# Patient Record
Sex: MALE | Race: WHITE | NOT HISPANIC OR LATINO | URBAN - METROPOLITAN AREA
[De-identification: names, ages, dates, MRNs, and addresses within clinical notes are randomized per-mention and may not be internally consistent; named-entity substitution may affect disease eponyms.]

---

## 2017-02-10 ENCOUNTER — OUTPATIENT (OUTPATIENT)
Dept: OUTPATIENT SERVICES | Facility: HOSPITAL | Age: 82
LOS: 1 days | End: 2017-02-10
Payer: MEDICARE

## 2017-02-10 ENCOUNTER — APPOINTMENT (OUTPATIENT)
Dept: RADIOLOGY | Facility: IMAGING CENTER | Age: 82
End: 2017-02-10

## 2017-02-10 DIAGNOSIS — Z95.1 PRESENCE OF AORTOCORONARY BYPASS GRAFT: Chronic | ICD-10-CM

## 2017-02-10 DIAGNOSIS — C44.91 BASAL CELL CARCINOMA OF SKIN, UNSPECIFIED: Chronic | ICD-10-CM

## 2017-02-10 DIAGNOSIS — C44.92 SQUAMOUS CELL CARCINOMA OF SKIN, UNSPECIFIED: Chronic | ICD-10-CM

## 2017-02-10 DIAGNOSIS — Z98.890 OTHER SPECIFIED POSTPROCEDURAL STATES: Chronic | ICD-10-CM

## 2017-02-10 DIAGNOSIS — Z85.46 PERSONAL HISTORY OF MALIGNANT NEOPLASM OF PROSTATE: Chronic | ICD-10-CM

## 2017-02-10 DIAGNOSIS — Z98.49 CATARACT EXTRACTION STATUS, UNSPECIFIED EYE: Chronic | ICD-10-CM

## 2017-02-10 DIAGNOSIS — C34.90 MALIGNANT NEOPLASM OF UNSPECIFIED PART OF UNSPECIFIED BRONCHUS OR LUNG: ICD-10-CM

## 2017-02-10 PROCEDURE — 71046 X-RAY EXAM CHEST 2 VIEWS: CPT

## 2017-02-14 ENCOUNTER — APPOINTMENT (OUTPATIENT)
Dept: THORACIC SURGERY | Facility: CLINIC | Age: 82
End: 2017-02-14

## 2017-02-14 VITALS
SYSTOLIC BLOOD PRESSURE: 122 MMHG | WEIGHT: 183 LBS | OXYGEN SATURATION: 98 % | BODY MASS INDEX: 29.41 KG/M2 | HEART RATE: 72 BPM | DIASTOLIC BLOOD PRESSURE: 74 MMHG | HEIGHT: 66 IN | RESPIRATION RATE: 18 BRPM

## 2017-03-02 ENCOUNTER — APPOINTMENT (OUTPATIENT)
Dept: SURGERY | Facility: CLINIC | Age: 82
End: 2017-03-02

## 2017-03-02 LAB
CALCIUM SERPL-MCNC: 9.3 MG/DL
CALCIUM SERPL-MCNC: 9.3 MG/DL
PARATHYROID HORMONE INTACT: 50 PG/ML

## 2017-03-03 LAB — 24R-OH-CALCIDIOL SERPL-MCNC: 39.2 PG/ML

## 2017-03-07 ENCOUNTER — OTHER (OUTPATIENT)
Age: 82
End: 2017-03-07

## 2017-05-08 ENCOUNTER — APPOINTMENT (OUTPATIENT)
Dept: CT IMAGING | Facility: IMAGING CENTER | Age: 82
End: 2017-05-08

## 2017-05-08 ENCOUNTER — OUTPATIENT (OUTPATIENT)
Dept: OUTPATIENT SERVICES | Facility: HOSPITAL | Age: 82
LOS: 1 days | End: 2017-05-08
Payer: MEDICARE

## 2017-05-08 DIAGNOSIS — C44.92 SQUAMOUS CELL CARCINOMA OF SKIN, UNSPECIFIED: Chronic | ICD-10-CM

## 2017-05-08 DIAGNOSIS — Z95.1 PRESENCE OF AORTOCORONARY BYPASS GRAFT: Chronic | ICD-10-CM

## 2017-05-08 DIAGNOSIS — Z85.46 PERSONAL HISTORY OF MALIGNANT NEOPLASM OF PROSTATE: Chronic | ICD-10-CM

## 2017-05-08 DIAGNOSIS — C34.90 MALIGNANT NEOPLASM OF UNSPECIFIED PART OF UNSPECIFIED BRONCHUS OR LUNG: ICD-10-CM

## 2017-05-08 DIAGNOSIS — Z98.890 OTHER SPECIFIED POSTPROCEDURAL STATES: Chronic | ICD-10-CM

## 2017-05-08 DIAGNOSIS — Z98.49 CATARACT EXTRACTION STATUS, UNSPECIFIED EYE: Chronic | ICD-10-CM

## 2017-05-08 DIAGNOSIS — C44.91 BASAL CELL CARCINOMA OF SKIN, UNSPECIFIED: Chronic | ICD-10-CM

## 2017-05-08 PROCEDURE — 71250 CT THORAX DX C-: CPT

## 2017-05-16 ENCOUNTER — APPOINTMENT (OUTPATIENT)
Dept: THORACIC SURGERY | Facility: CLINIC | Age: 82
End: 2017-05-16

## 2017-05-16 VITALS
WEIGHT: 186 LBS | SYSTOLIC BLOOD PRESSURE: 135 MMHG | HEART RATE: 72 BPM | OXYGEN SATURATION: 97 % | BODY MASS INDEX: 29.89 KG/M2 | RESPIRATION RATE: 18 BRPM | DIASTOLIC BLOOD PRESSURE: 80 MMHG | HEIGHT: 66 IN

## 2017-08-08 ENCOUNTER — APPOINTMENT (OUTPATIENT)
Dept: RADIOLOGY | Facility: IMAGING CENTER | Age: 82
End: 2017-08-08
Payer: MEDICARE

## 2017-08-08 ENCOUNTER — OUTPATIENT (OUTPATIENT)
Dept: OUTPATIENT SERVICES | Facility: HOSPITAL | Age: 82
LOS: 1 days | End: 2017-08-08
Payer: MEDICARE

## 2017-08-08 DIAGNOSIS — C34.90 MALIGNANT NEOPLASM OF UNSPECIFIED PART OF UNSPECIFIED BRONCHUS OR LUNG: ICD-10-CM

## 2017-08-08 DIAGNOSIS — Z98.49 CATARACT EXTRACTION STATUS, UNSPECIFIED EYE: Chronic | ICD-10-CM

## 2017-08-08 DIAGNOSIS — C44.92 SQUAMOUS CELL CARCINOMA OF SKIN, UNSPECIFIED: Chronic | ICD-10-CM

## 2017-08-08 DIAGNOSIS — C44.91 BASAL CELL CARCINOMA OF SKIN, UNSPECIFIED: Chronic | ICD-10-CM

## 2017-08-08 DIAGNOSIS — Z95.1 PRESENCE OF AORTOCORONARY BYPASS GRAFT: Chronic | ICD-10-CM

## 2017-08-08 DIAGNOSIS — Z85.46 PERSONAL HISTORY OF MALIGNANT NEOPLASM OF PROSTATE: Chronic | ICD-10-CM

## 2017-08-08 DIAGNOSIS — Z98.890 OTHER SPECIFIED POSTPROCEDURAL STATES: Chronic | ICD-10-CM

## 2017-08-08 PROCEDURE — 71046 X-RAY EXAM CHEST 2 VIEWS: CPT

## 2017-08-08 PROCEDURE — 71020: CPT | Mod: 26

## 2017-08-15 ENCOUNTER — APPOINTMENT (OUTPATIENT)
Dept: THORACIC SURGERY | Facility: CLINIC | Age: 82
End: 2017-08-15
Payer: MEDICARE

## 2017-08-15 VITALS
HEIGHT: 66 IN | OXYGEN SATURATION: 98 % | HEART RATE: 72 BPM | SYSTOLIC BLOOD PRESSURE: 110 MMHG | BODY MASS INDEX: 29.89 KG/M2 | RESPIRATION RATE: 18 BRPM | DIASTOLIC BLOOD PRESSURE: 75 MMHG | WEIGHT: 186 LBS

## 2017-08-15 PROCEDURE — 99214 OFFICE O/P EST MOD 30 MIN: CPT

## 2017-08-15 RX ORDER — PANTOPRAZOLE 40 MG/1
40 TABLET, DELAYED RELEASE ORAL
Qty: 30 | Refills: 0 | Status: COMPLETED | COMMUNITY
Start: 2017-08-12

## 2017-08-15 RX ORDER — LOSARTAN POTASSIUM 50 MG/1
50 TABLET, FILM COATED ORAL
Qty: 90 | Refills: 0 | Status: COMPLETED | COMMUNITY
Start: 2017-04-28

## 2017-11-13 ENCOUNTER — APPOINTMENT (OUTPATIENT)
Dept: CT IMAGING | Facility: IMAGING CENTER | Age: 82
End: 2017-11-13
Payer: MEDICARE

## 2017-11-13 ENCOUNTER — OUTPATIENT (OUTPATIENT)
Dept: OUTPATIENT SERVICES | Facility: HOSPITAL | Age: 82
LOS: 1 days | End: 2017-11-13
Payer: MEDICARE

## 2017-11-13 DIAGNOSIS — Z98.890 OTHER SPECIFIED POSTPROCEDURAL STATES: Chronic | ICD-10-CM

## 2017-11-13 DIAGNOSIS — Z98.49 CATARACT EXTRACTION STATUS, UNSPECIFIED EYE: Chronic | ICD-10-CM

## 2017-11-13 DIAGNOSIS — Z95.1 PRESENCE OF AORTOCORONARY BYPASS GRAFT: Chronic | ICD-10-CM

## 2017-11-13 DIAGNOSIS — C34.90 MALIGNANT NEOPLASM OF UNSPECIFIED PART OF UNSPECIFIED BRONCHUS OR LUNG: ICD-10-CM

## 2017-11-13 DIAGNOSIS — C44.92 SQUAMOUS CELL CARCINOMA OF SKIN, UNSPECIFIED: Chronic | ICD-10-CM

## 2017-11-13 DIAGNOSIS — Z85.46 PERSONAL HISTORY OF MALIGNANT NEOPLASM OF PROSTATE: Chronic | ICD-10-CM

## 2017-11-13 DIAGNOSIS — C44.91 BASAL CELL CARCINOMA OF SKIN, UNSPECIFIED: Chronic | ICD-10-CM

## 2017-11-13 PROCEDURE — 71250 CT THORAX DX C-: CPT

## 2017-11-13 PROCEDURE — 71250 CT THORAX DX C-: CPT | Mod: 26

## 2017-11-21 ENCOUNTER — OTHER (OUTPATIENT)
Age: 82
End: 2017-11-21

## 2017-11-21 ENCOUNTER — APPOINTMENT (OUTPATIENT)
Dept: SURGERY | Facility: CLINIC | Age: 82
End: 2017-11-21

## 2017-11-21 ENCOUNTER — APPOINTMENT (OUTPATIENT)
Dept: THORACIC SURGERY | Facility: CLINIC | Age: 82
End: 2017-11-21
Payer: MEDICARE

## 2017-11-21 VITALS
DIASTOLIC BLOOD PRESSURE: 80 MMHG | OXYGEN SATURATION: 97 % | HEIGHT: 66 IN | WEIGHT: 180 LBS | HEART RATE: 67 BPM | RESPIRATION RATE: 18 BRPM | SYSTOLIC BLOOD PRESSURE: 130 MMHG | BODY MASS INDEX: 28.93 KG/M2

## 2017-11-21 PROCEDURE — 99214 OFFICE O/P EST MOD 30 MIN: CPT

## 2017-11-21 RX ORDER — PANTOPRAZOLE SODIUM 40 MG/1
40 TABLET, DELAYED RELEASE ORAL
Refills: 0 | Status: ACTIVE | COMMUNITY

## 2018-03-06 ENCOUNTER — OUTPATIENT (OUTPATIENT)
Dept: OUTPATIENT SERVICES | Facility: HOSPITAL | Age: 83
LOS: 1 days | End: 2018-03-06
Payer: MEDICARE

## 2018-03-06 ENCOUNTER — APPOINTMENT (OUTPATIENT)
Dept: RADIOLOGY | Facility: IMAGING CENTER | Age: 83
End: 2018-03-06
Payer: MEDICARE

## 2018-03-06 DIAGNOSIS — C44.92 SQUAMOUS CELL CARCINOMA OF SKIN, UNSPECIFIED: Chronic | ICD-10-CM

## 2018-03-06 DIAGNOSIS — Z95.1 PRESENCE OF AORTOCORONARY BYPASS GRAFT: Chronic | ICD-10-CM

## 2018-03-06 DIAGNOSIS — Z85.46 PERSONAL HISTORY OF MALIGNANT NEOPLASM OF PROSTATE: Chronic | ICD-10-CM

## 2018-03-06 DIAGNOSIS — Z98.49 CATARACT EXTRACTION STATUS, UNSPECIFIED EYE: Chronic | ICD-10-CM

## 2018-03-06 DIAGNOSIS — Z98.890 OTHER SPECIFIED POSTPROCEDURAL STATES: Chronic | ICD-10-CM

## 2018-03-06 DIAGNOSIS — C44.91 BASAL CELL CARCINOMA OF SKIN, UNSPECIFIED: Chronic | ICD-10-CM

## 2018-03-06 DIAGNOSIS — C34.90 MALIGNANT NEOPLASM OF UNSPECIFIED PART OF UNSPECIFIED BRONCHUS OR LUNG: ICD-10-CM

## 2018-03-06 PROCEDURE — 71046 X-RAY EXAM CHEST 2 VIEWS: CPT

## 2018-03-06 PROCEDURE — 71046 X-RAY EXAM CHEST 2 VIEWS: CPT | Mod: 26

## 2018-03-13 ENCOUNTER — APPOINTMENT (OUTPATIENT)
Dept: THORACIC SURGERY | Facility: CLINIC | Age: 83
End: 2018-03-13

## 2018-03-29 ENCOUNTER — APPOINTMENT (OUTPATIENT)
Dept: SURGERY | Facility: CLINIC | Age: 83
End: 2018-03-29
Payer: MEDICARE

## 2018-03-29 DIAGNOSIS — E83.52 HYPERCALCEMIA: ICD-10-CM

## 2018-03-29 PROCEDURE — 36415 COLL VENOUS BLD VENIPUNCTURE: CPT

## 2018-03-29 PROCEDURE — 99213 OFFICE O/P EST LOW 20 MIN: CPT

## 2018-03-30 LAB
24R-OH-CALCIDIOL SERPL-MCNC: 46 PG/ML
CALCIUM SERPL-MCNC: 9.9 MG/DL
CALCIUM SERPL-MCNC: 9.9 MG/DL
PARATHYROID HORMONE INTACT: 35 PG/ML

## 2018-04-02 ENCOUNTER — OTHER (OUTPATIENT)
Age: 83
End: 2018-04-02

## 2018-04-03 ENCOUNTER — APPOINTMENT (OUTPATIENT)
Dept: THORACIC SURGERY | Facility: CLINIC | Age: 83
End: 2018-04-03
Payer: MEDICARE

## 2018-04-03 VITALS
DIASTOLIC BLOOD PRESSURE: 64 MMHG | HEIGHT: 66 IN | BODY MASS INDEX: 28.61 KG/M2 | OXYGEN SATURATION: 95 % | SYSTOLIC BLOOD PRESSURE: 112 MMHG | WEIGHT: 178 LBS | RESPIRATION RATE: 18 BRPM | HEART RATE: 68 BPM

## 2018-04-03 PROCEDURE — 99214 OFFICE O/P EST MOD 30 MIN: CPT

## 2018-06-26 ENCOUNTER — APPOINTMENT (OUTPATIENT)
Dept: CT IMAGING | Facility: IMAGING CENTER | Age: 83
End: 2018-06-26
Payer: MEDICARE

## 2018-06-26 ENCOUNTER — OUTPATIENT (OUTPATIENT)
Dept: OUTPATIENT SERVICES | Facility: HOSPITAL | Age: 83
LOS: 1 days | End: 2018-06-26
Payer: MEDICARE

## 2018-06-26 DIAGNOSIS — Z85.46 PERSONAL HISTORY OF MALIGNANT NEOPLASM OF PROSTATE: Chronic | ICD-10-CM

## 2018-06-26 DIAGNOSIS — C44.92 SQUAMOUS CELL CARCINOMA OF SKIN, UNSPECIFIED: Chronic | ICD-10-CM

## 2018-06-26 DIAGNOSIS — Z98.890 OTHER SPECIFIED POSTPROCEDURAL STATES: Chronic | ICD-10-CM

## 2018-06-26 DIAGNOSIS — C44.91 BASAL CELL CARCINOMA OF SKIN, UNSPECIFIED: Chronic | ICD-10-CM

## 2018-06-26 DIAGNOSIS — Z98.49 CATARACT EXTRACTION STATUS, UNSPECIFIED EYE: Chronic | ICD-10-CM

## 2018-06-26 DIAGNOSIS — C34.90 MALIGNANT NEOPLASM OF UNSPECIFIED PART OF UNSPECIFIED BRONCHUS OR LUNG: ICD-10-CM

## 2018-06-26 DIAGNOSIS — Z95.1 PRESENCE OF AORTOCORONARY BYPASS GRAFT: Chronic | ICD-10-CM

## 2018-06-26 PROCEDURE — 71250 CT THORAX DX C-: CPT

## 2018-06-26 PROCEDURE — 71250 CT THORAX DX C-: CPT | Mod: 26

## 2018-07-03 ENCOUNTER — APPOINTMENT (OUTPATIENT)
Dept: THORACIC SURGERY | Facility: CLINIC | Age: 83
End: 2018-07-03
Payer: MEDICARE

## 2018-07-03 VITALS
DIASTOLIC BLOOD PRESSURE: 73 MMHG | OXYGEN SATURATION: 98 % | BODY MASS INDEX: 29.05 KG/M2 | WEIGHT: 180 LBS | HEART RATE: 71 BPM | RESPIRATION RATE: 18 BRPM | SYSTOLIC BLOOD PRESSURE: 128 MMHG

## 2018-07-03 PROCEDURE — 99214 OFFICE O/P EST MOD 30 MIN: CPT

## 2018-12-07 ENCOUNTER — OTHER (OUTPATIENT)
Age: 83
End: 2018-12-07

## 2018-12-26 ENCOUNTER — APPOINTMENT (OUTPATIENT)
Dept: RADIOLOGY | Facility: IMAGING CENTER | Age: 83
End: 2018-12-26
Payer: MEDICARE

## 2018-12-26 ENCOUNTER — OUTPATIENT (OUTPATIENT)
Dept: OUTPATIENT SERVICES | Facility: HOSPITAL | Age: 83
LOS: 1 days | End: 2018-12-26
Payer: MEDICARE

## 2018-12-26 DIAGNOSIS — C44.92 SQUAMOUS CELL CARCINOMA OF SKIN, UNSPECIFIED: Chronic | ICD-10-CM

## 2018-12-26 DIAGNOSIS — Z98.49 CATARACT EXTRACTION STATUS, UNSPECIFIED EYE: Chronic | ICD-10-CM

## 2018-12-26 DIAGNOSIS — C44.91 BASAL CELL CARCINOMA OF SKIN, UNSPECIFIED: Chronic | ICD-10-CM

## 2018-12-26 DIAGNOSIS — Z95.1 PRESENCE OF AORTOCORONARY BYPASS GRAFT: Chronic | ICD-10-CM

## 2018-12-26 DIAGNOSIS — Z85.46 PERSONAL HISTORY OF MALIGNANT NEOPLASM OF PROSTATE: Chronic | ICD-10-CM

## 2018-12-26 DIAGNOSIS — C34.90 MALIGNANT NEOPLASM OF UNSPECIFIED PART OF UNSPECIFIED BRONCHUS OR LUNG: ICD-10-CM

## 2018-12-26 DIAGNOSIS — Z98.890 OTHER SPECIFIED POSTPROCEDURAL STATES: Chronic | ICD-10-CM

## 2018-12-26 PROCEDURE — 71046 X-RAY EXAM CHEST 2 VIEWS: CPT | Mod: 26

## 2018-12-26 PROCEDURE — 71046 X-RAY EXAM CHEST 2 VIEWS: CPT

## 2019-01-08 ENCOUNTER — APPOINTMENT (OUTPATIENT)
Dept: THORACIC SURGERY | Facility: CLINIC | Age: 84
End: 2019-01-08
Payer: MEDICARE

## 2019-01-08 VITALS
RESPIRATION RATE: 16 BRPM | TEMPERATURE: 98.8 F | BODY MASS INDEX: 28.61 KG/M2 | OXYGEN SATURATION: 95 % | SYSTOLIC BLOOD PRESSURE: 145 MMHG | DIASTOLIC BLOOD PRESSURE: 76 MMHG | HEIGHT: 66 IN | WEIGHT: 178 LBS | HEART RATE: 54 BPM

## 2019-01-08 PROCEDURE — 99213 OFFICE O/P EST LOW 20 MIN: CPT

## 2019-01-08 RX ORDER — ATENOLOL 25 MG/1
25 TABLET ORAL
Refills: 0 | Status: ACTIVE | COMMUNITY

## 2019-01-16 NOTE — CONSULT LETTER
[Dear  ___] : Dear  [unfilled], [Courtesy Letter:] : I had the pleasure of seeing your patient, [unfilled], in my office today. [Please see my note below.] : Please see my note below. [Sincerely,] : Sincerely, [FreeTextEntry2] : Dr. Nik Hernandez (Ref) [FreeTextEntry3] : \par \par \par Quan Olvera MD, FACS \par Chief, Division of Thoracic Surgery \par Director, Minimally Invasive Thoracic Surgery \par Department of Cardiovascular and Thoracic Surgery \par [facility] \par , Cardiovascular and Thoracic Surgery

## 2019-01-16 NOTE — HISTORY OF PRESENT ILLNESS
[FreeTextEntry1] : 84 year old male S/P Lt vats Upper lobe wedge resection 7/11/16 for a T1aNo squamous cell cancer. On last visit on 4/3/18, chest x ray 3/6/18 showed stable chest x ray with post surgical changes. He returns to the office today for follow up visit with Ct scan of the chest completed on 6/26/18 which reports opacity along the wedge resection margin postoperative in nature and a 2 mm right lower lobe nodule that is unchanged since 10/31/16. \par \par CXR 12/26/18 revealed:\par - no consolidation, pleural effusion or pneumothorax\par - postsurgical changes within the left lung are again noted\par - stable exam\par \par Mr. Mercado reports feeling well. He is short of breath on exertion at baseline. He denies any fever, chills, cough, shortness of breath, chest pain, hemoptysis, or recent illness.  He complains of some intermittent abdominal discomfort and reports history of pancreatitis.  He sees gastroenterologist. Dr. Mckenzie.

## 2019-01-16 NOTE — REVIEW OF SYSTEMS
[Feeling Tired] : feeling tired [Loss Of Hearing] : hearing loss [Chest Pain] : chest pain [SOB on Exertion] : shortness of breath during exertion [Abdominal Pain] : abdominal pain [Heartburn] : heartburn [Arthralgias] : arthralgias [Difficulty Walking] : difficulty walking [Negative] : Heme/Lymph [Fever] : no fever [Chills] : no chills [Feeling Poorly] : not feeling poorly [Recent Weight Gain (___ Lbs)] : no recent weight gain [Recent Weight Loss (___ Lbs)] : no recent weight loss [Earache] : no earache [Nosebleeds] : no nosebleeds [Nasal Discharge] : no nasal discharge [Sore Throat] : no sore throat [Hoarseness] : no hoarseness [Heart Rate Is Slow] : the heart rate was not slow [Heart Rate Is Fast] : the heart rate was not fast [Leg Claudication] : no intermittent leg claudication [Lower Ext Edema] : no extremity edema [Shortness Of Breath] : no shortness of breath [Wheezing] : no wheezing [Cough] : no cough [Orthopnea] : no orthopnea [PND] : no PND [Vomiting] : no vomiting [Constipation] : no constipation [Diarrhea] : no diarrhea [Confused] : no confusion [Convulsions] : no convulsions [Dizziness] : no dizziness [Fainting] : no fainting [FreeTextEntry4] : denies any recent vertigo [FreeTextEntry5] : episode of angina last week, relieved with NitroStat [de-identified] : ambulates with cane for balance, denies falls

## 2019-01-16 NOTE — PHYSICAL EXAM
[Sclera] : the sclera and conjunctiva were normal [] : the neck was supple [Neck Cervical Mass (___cm)] : no neck mass was observed [Respiration, Rhythm And Depth] : normal respiratory rhythm and effort [Auscultation Breath Sounds / Voice Sounds] : lungs were clear to auscultation bilaterally [Heart Rate And Rhythm] : heart rate was normal and rhythm regular [Heart Sounds] : normal S1 and S2 [Chest Visual Inspection Thoracic Asymmetry] : no chest asymmetry [Diminished Respiratory Excursion] : normal chest expansion [Abdomen Soft] : soft [Abdomen Tenderness] : non-tender [Cervical Lymph Nodes Enlarged Posterior Bilaterally] : posterior cervical [Cervical Lymph Nodes Enlarged Anterior Bilaterally] : anterior cervical [Supraclavicular Lymph Nodes Enlarged Bilaterally] : supraclavicular [No CVA Tenderness] : no ~M costovertebral angle tenderness [No Spinal Tenderness] : no spinal tenderness [Abnormal Walk] : normal gait [Skin Color & Pigmentation] : normal skin color and pigmentation [No Focal Deficits] : no focal deficits [Oriented To Time, Place, And Person] : oriented to person, place, and time [Impaired Insight] : insight and judgment were intact [Affect] : the affect was normal [FreeTextEntry1] : ambulates with cane (for balance)

## 2019-01-16 NOTE — ASSESSMENT
[FreeTextEntry1] : 84 year old male S/P Lt vats Upper lobe wedge resection 7/11/16 for a T1aNo squamous cell cancer. On last visit on 4/3/18, chest x ray 3/6/18 showed stable chest x ray with post surgical changes. He returns to the office today for follow up visit with Ct scan of the chest completed on 6/26/18 which reports opacity along the wedge resection margin postoperative in nature and a 2 mm right lower lobe nodule that is unchanged since 10/31/16. \par \par CXR 12/26/18 revealed:\par - no consolidation, pleural effusion or pneumothorax\par - postsurgical changes within the left lung are again noted\par - stable exam\par \par I have reviewed the patient's medical records and diagnostic images during the time of this office visit, and I have made the following recommendation:\par 1.  I would like to see Mr. Mercado back in 6 months with a non-contrast Chest CT Scan.\par \par Written by Leonor Vernon NP, acting as a scribe for JORDAN RICHEY MD.\par \par The documentation recorded by the scribe accurately reflects the service I personally performed and the decisions made by me. JORDAN RICHEY MD\par

## 2019-07-02 ENCOUNTER — APPOINTMENT (OUTPATIENT)
Dept: CT IMAGING | Facility: IMAGING CENTER | Age: 84
End: 2019-07-02
Payer: MEDICARE

## 2019-07-02 ENCOUNTER — OUTPATIENT (OUTPATIENT)
Dept: OUTPATIENT SERVICES | Facility: HOSPITAL | Age: 84
LOS: 1 days | End: 2019-07-02
Payer: MEDICARE

## 2019-07-02 DIAGNOSIS — Z98.49 CATARACT EXTRACTION STATUS, UNSPECIFIED EYE: Chronic | ICD-10-CM

## 2019-07-02 DIAGNOSIS — C44.91 BASAL CELL CARCINOMA OF SKIN, UNSPECIFIED: Chronic | ICD-10-CM

## 2019-07-02 DIAGNOSIS — Z98.890 OTHER SPECIFIED POSTPROCEDURAL STATES: Chronic | ICD-10-CM

## 2019-07-02 DIAGNOSIS — Z85.46 PERSONAL HISTORY OF MALIGNANT NEOPLASM OF PROSTATE: Chronic | ICD-10-CM

## 2019-07-02 DIAGNOSIS — C34.90 MALIGNANT NEOPLASM OF UNSPECIFIED PART OF UNSPECIFIED BRONCHUS OR LUNG: ICD-10-CM

## 2019-07-02 DIAGNOSIS — Z95.1 PRESENCE OF AORTOCORONARY BYPASS GRAFT: Chronic | ICD-10-CM

## 2019-07-02 DIAGNOSIS — C44.92 SQUAMOUS CELL CARCINOMA OF SKIN, UNSPECIFIED: Chronic | ICD-10-CM

## 2019-07-02 PROCEDURE — 71250 CT THORAX DX C-: CPT

## 2019-07-02 PROCEDURE — 71250 CT THORAX DX C-: CPT | Mod: 26

## 2019-07-09 ENCOUNTER — APPOINTMENT (OUTPATIENT)
Dept: THORACIC SURGERY | Facility: CLINIC | Age: 84
End: 2019-07-09
Payer: MEDICARE

## 2019-07-09 VITALS
DIASTOLIC BLOOD PRESSURE: 73 MMHG | OXYGEN SATURATION: 96 % | RESPIRATION RATE: 16 BRPM | HEART RATE: 64 BPM | WEIGHT: 178 LBS | SYSTOLIC BLOOD PRESSURE: 119 MMHG | BODY MASS INDEX: 28.61 KG/M2 | HEIGHT: 66 IN | TEMPERATURE: 98 F

## 2019-07-09 PROCEDURE — 99214 OFFICE O/P EST MOD 30 MIN: CPT

## 2019-07-09 NOTE — PHYSICAL EXAM
[Sclera] : the sclera and conjunctiva were normal [Neck Appearance] : the appearance of the neck was normal [Extraocular Movements] : extraocular movements were intact [Neck Cervical Mass (___cm)] : no neck mass was observed [Jugular Venous Distention Increased] : there was no jugular-venous distention [Respiration, Rhythm And Depth] : normal respiratory rhythm and effort [] : no respiratory distress [Exaggerated Use Of Accessory Muscles For Inspiration] : no accessory muscle use [Auscultation Breath Sounds / Voice Sounds] : lungs were clear to auscultation bilaterally [Heart Rate And Rhythm] : heart rate was normal and rhythm regular [Diminished Respiratory Excursion] : normal chest expansion [Abdomen Soft] : soft [Bowel Sounds] : normal bowel sounds [Abdomen Tenderness] : non-tender [Cervical Lymph Nodes Enlarged Posterior Bilaterally] : posterior cervical [Cervical Lymph Nodes Enlarged Anterior Bilaterally] : anterior cervical [Supraclavicular Lymph Nodes Enlarged Bilaterally] : supraclavicular [Abnormal Walk] : normal gait [Skin Color & Pigmentation] : normal skin color and pigmentation [Impaired Insight] : insight and judgment were intact [Oriented To Time, Place, And Person] : oriented to person, place, and time [No Focal Deficits] : no focal deficits [Affect] : the affect was normal [Mood] : the mood was normal

## 2019-07-11 NOTE — HISTORY OF PRESENT ILLNESS
[FreeTextEntry1] : Mr. Mercado is a 85 year old male, former smoker, who presents today for follow up.  He is s/p Left VATS, MELLISA wedge resection on 7/11/16, pathology revealed squamous cell carcinoma, T1aN0.  He has a history of pancreatitis, followed by gastroenterologist Dr. Mckenzie and prostate CA in 2000 treated with radioactive seeds implantation. His PSA levels are being monitored by Dr. Latham. He also has a history of triple bypass in 1994 in [facility].\par \par CT Chest on 7/2/19 reveals:\par - RLL superior segment 0.2 cm solid nodule, unchanged \par - No hilar and/or mediastinal adenopathy\par \par CXR 12/26/18 revealed:\par - no consolidation, pleural effusion or pneumothorax\par - postsurgical changes within the left lung are again noted\par - stable exam\par \par  Stable shortness of breath with exertion. The patient denies URI, fever, chills, dysphagia or hemoptysis. \par

## 2019-07-11 NOTE — ASSESSMENT
[FreeTextEntry1] : Mr. Mercado is a 85 year old male, former smoker, who presents today for follow up.  He is s/p Left VATS, MELLISA wedge resection on 7/11/16, pathology revealed squamous cell carcinoma, T1aN0.  He has a history of pancreatitis, followed by gastroenterologist Dr. Mckenzie and prostate CA in 2000 treated with radioactive seeds implantation. His PSA levels are being monitored by Dr. Latham. He also has a history of triple bypass in 1994 in [facility].\par \par CT Chest on 7/2/19 reveals:\par - RLL superior segment 0.2 cm solid nodule, unchanged \par - No hilar and/or mediastinal adenopathy\par \par I have reviewed the images with the patient and made the following recommendations. I have recommended that the patient follow up in 6 months with a CXR. \par \par Written by Annelise Madera NP, acting as a scribe for Quan Olvera MD.\par The documentation recorded by the scribe accurately reflects the service I personally performed and the decisions made by me. Quan Olvera MD\par \par

## 2019-07-11 NOTE — CONSULT LETTER
[Courtesy Letter:] : I had the pleasure of seeing your patient, [unfilled], in my office today. [Sincerely,] : Sincerely, [Please see my note below.] : Please see my note below. [FreeTextEntry2] : Dr. Nik Hernandez (Ref) \par  [FreeTextEntry3] : \par \par \par Quan Olvera MD, FACS \par Chief, Division of Thoracic Surgery \par Director, Minimally Invasive Thoracic Surgery \par Department of Cardiovascular and Thoracic Surgery \par [facility] \par , Cardiovascular and Thoracic Surgery

## 2020-01-07 ENCOUNTER — APPOINTMENT (OUTPATIENT)
Dept: RADIOLOGY | Facility: IMAGING CENTER | Age: 85
End: 2020-01-07
Payer: MEDICARE

## 2020-01-07 ENCOUNTER — OUTPATIENT (OUTPATIENT)
Dept: OUTPATIENT SERVICES | Facility: HOSPITAL | Age: 85
LOS: 1 days | End: 2020-01-07
Payer: MEDICARE

## 2020-01-07 DIAGNOSIS — Z98.890 OTHER SPECIFIED POSTPROCEDURAL STATES: Chronic | ICD-10-CM

## 2020-01-07 DIAGNOSIS — Z85.46 PERSONAL HISTORY OF MALIGNANT NEOPLASM OF PROSTATE: Chronic | ICD-10-CM

## 2020-01-07 DIAGNOSIS — C44.91 BASAL CELL CARCINOMA OF SKIN, UNSPECIFIED: Chronic | ICD-10-CM

## 2020-01-07 DIAGNOSIS — C44.92 SQUAMOUS CELL CARCINOMA OF SKIN, UNSPECIFIED: Chronic | ICD-10-CM

## 2020-01-07 DIAGNOSIS — Z95.1 PRESENCE OF AORTOCORONARY BYPASS GRAFT: Chronic | ICD-10-CM

## 2020-01-07 DIAGNOSIS — Z98.49 CATARACT EXTRACTION STATUS, UNSPECIFIED EYE: Chronic | ICD-10-CM

## 2020-01-07 DIAGNOSIS — C34.90 MALIGNANT NEOPLASM OF UNSPECIFIED PART OF UNSPECIFIED BRONCHUS OR LUNG: ICD-10-CM

## 2020-01-07 PROCEDURE — 71046 X-RAY EXAM CHEST 2 VIEWS: CPT

## 2020-01-07 PROCEDURE — 71046 X-RAY EXAM CHEST 2 VIEWS: CPT | Mod: 26

## 2020-01-14 ENCOUNTER — APPOINTMENT (OUTPATIENT)
Dept: THORACIC SURGERY | Facility: CLINIC | Age: 85
End: 2020-01-14
Payer: MEDICARE

## 2020-01-14 VITALS
BODY MASS INDEX: 29.41 KG/M2 | RESPIRATION RATE: 17 BRPM | SYSTOLIC BLOOD PRESSURE: 137 MMHG | OXYGEN SATURATION: 96 % | HEIGHT: 66 IN | HEART RATE: 61 BPM | WEIGHT: 183 LBS | TEMPERATURE: 98.3 F | DIASTOLIC BLOOD PRESSURE: 78 MMHG

## 2020-01-14 PROCEDURE — 99214 OFFICE O/P EST MOD 30 MIN: CPT

## 2020-01-16 NOTE — PHYSICAL EXAM
[Auscultation Breath Sounds / Voice Sounds] : lungs were clear to auscultation bilaterally [Heart Rate And Rhythm] : heart rate was normal and rhythm regular [Heart Sounds] : normal S1 and S2 [Heart Sounds Gallop] : no gallops [Murmurs] : no murmurs [Heart Sounds Pericardial Friction Rub] : no pericardial rub [Chest Visual Inspection Thoracic Asymmetry] : no chest asymmetry [Examination Of The Chest] : the chest was normal in appearance [Diminished Respiratory Excursion] : normal chest expansion [2+] : right 2+ [No Abnormalities] : the abdominal aorta was not enlarged and no bruit was heard [Breast Appearance] : normal in appearance [Breast Palpation Mass] : no palpable masses [Bowel Sounds] : normal bowel sounds [Abdomen Soft] : soft [Abdomen Tenderness] : non-tender [Cervical Lymph Nodes Enlarged Anterior Bilaterally] : anterior cervical [Cervical Lymph Nodes Enlarged Posterior Bilaterally] : posterior cervical [Abdomen Mass (___ Cm)] : no abdominal mass palpated [Supraclavicular Lymph Nodes Enlarged Bilaterally] : supraclavicular [No CVA Tenderness] : no ~M costovertebral angle tenderness [No Spinal Tenderness] : no spinal tenderness [Abnormal Walk] : normal gait [Motor Tone] : muscle strength and tone were normal [Musculoskeletal - Swelling] : no joint swelling seen [Nail Clubbing] : no clubbing  or cyanosis of the fingernails [Skin Turgor] : normal skin turgor [Skin Color & Pigmentation] : normal skin color and pigmentation [Deep Tendon Reflexes (DTR)] : deep tendon reflexes were 2+ and symmetric [] : no rash [Sensation] : the sensory exam was normal to light touch and pinprick [No Focal Deficits] : no focal deficits [Oriented To Time, Place, And Person] : oriented to person, place, and time [Impaired Insight] : insight and judgment were intact [Affect] : the affect was normal [Left Carotid Bruit] : no bruit heard over the left carotid [Right Carotid Bruit] : no bruit heard over the right carotid [Right Femoral Bruit] : no bruit heard over the right femoral artery [Left Femoral Bruit] : no bruit heard over the left femoral artery

## 2020-01-16 NOTE — DATA REVIEWED
[FreeTextEntry1] : CXR on 01/07/2020:\par - minimal blunting left costophrenic angle may be due to a trace pleural effusion or pleural thickening. The possibility of a tiny left apical pneumothorax is raised

## 2020-01-16 NOTE — ASSESSMENT
[FreeTextEntry1] : Mr. Mercado is a 86 year old male, former smoker, who presents today for follow up. He is s/p Left VATS, MELLISA wedge resection on 7/11/16, pathology revealed squamous cell carcinoma, T1aN0. \par \par He has a history of pancreatitis, followed by gastroenterologist Dr. Mckenzie and prostate CA in 2000 treated with radioactive seeds implantation. His PSA levels are being monitored by Dr. Latham. He also has a history of triple bypass in 1994 in [facility].\par \par CXR on 01/07/2020:\par - minimal blunting left costophrenic angle may be due to a trace pleural effusion or pleural thickening. The possibility of a tiny left apical pneumothorax is raised \par \par I have reviewed the patient's medical records and diagnostic images at time of this office consultation and have made the following recommendation:\par 1. CXR reviewed and explained to patient. Patient is doing well. I recommended patient to RTC in 6 months with CT Chest without contrast. \par \par \par I personally performed the services described in the documentation, reviewed the documentation recorded by the scribe in my presence and it accurately and completely records my words and actions.\par \par I, ALXEANDRA MANRIQUEZ NP and Doretha Khoury NP, am scribing for and the presence of JOSEP Blackwell, the following sections HISTORY OF PRESENT ILLNESS, PAST MEDICAL/FAMILY/SOCIAL HISTORY; REVIEW OF SYSTEMS; VITAL SIGNS; PHYSICAL EXAM; DISPOSITION.

## 2020-01-16 NOTE — HISTORY OF PRESENT ILLNESS
[FreeTextEntry1] : Mr. Mercado is a 86 year old male, former smoker, who presents today for follow up. He is s/p Left VATS, MELLISA wedge resection on 7/11/16, pathology revealed squamous cell carcinoma, T1aN0. \par \par He has a history of pancreatitis, followed by gastroenterologist Dr. Mckenzie and prostate CA in 2000 treated with radioactive seeds implantation. His PSA levels are being monitored by Dr. Latham. He also has a history of triple bypass in 1994 in [facility].\par \par CXR on 01/07/2020:\par - minimal blunting left costophrenic angle may be due to a trace pleural effusion or pleural thickening. The possibility of a tiny left apical pneumothorax is raised \par \par \par CT Chest on 7/2/19 reveals:\par - RLL superior segment 0.2 cm solid nodule, unchanged \par - No hilar and/or mediastinal adenopathy\par \par Patient is here today for a follow up. Patient c/o SOB on exertion, denied CP and cough. \par

## 2020-01-16 NOTE — CONSULT LETTER
[Dear  ___] : Dear  [unfilled], [Consult Letter:] : I had the pleasure of evaluating your patient, [unfilled]. [Please see my note below.] : Please see my note below. [( Thank you for referring [unfilled] for consultation for _____ )] : Thank you for referring [unfilled] for consultation for [unfilled] [Consult Closing:] : Thank you very much for allowing me to participate in the care of this patient.  If you have any questions, please do not hesitate to contact me. [Sincerely,] : Sincerely, [FreeTextEntry2] : Dr. Nik Hernandez (Ref)  [FreeTextEntry3] : Quan Olvera MD, FACS \par Chief, Division of Thoracic Surgery \par Director, Minimally Invasive Thoracic Surgery \par Department of Cardiovascular and Thoracic Surgery \par [facility] \par , Cardiovascular and Thoracic Surgery\par

## 2020-09-15 ENCOUNTER — RESULT REVIEW (OUTPATIENT)
Age: 85
End: 2020-09-15

## 2020-09-15 ENCOUNTER — OUTPATIENT (OUTPATIENT)
Dept: OUTPATIENT SERVICES | Facility: HOSPITAL | Age: 85
LOS: 1 days | End: 2020-09-15
Payer: MEDICARE

## 2020-09-15 ENCOUNTER — APPOINTMENT (OUTPATIENT)
Dept: CT IMAGING | Facility: IMAGING CENTER | Age: 85
End: 2020-09-15
Payer: MEDICARE

## 2020-09-15 DIAGNOSIS — C44.92 SQUAMOUS CELL CARCINOMA OF SKIN, UNSPECIFIED: Chronic | ICD-10-CM

## 2020-09-15 DIAGNOSIS — Z85.46 PERSONAL HISTORY OF MALIGNANT NEOPLASM OF PROSTATE: Chronic | ICD-10-CM

## 2020-09-15 DIAGNOSIS — C44.91 BASAL CELL CARCINOMA OF SKIN, UNSPECIFIED: Chronic | ICD-10-CM

## 2020-09-15 DIAGNOSIS — C34.90 MALIGNANT NEOPLASM OF UNSPECIFIED PART OF UNSPECIFIED BRONCHUS OR LUNG: ICD-10-CM

## 2020-09-15 DIAGNOSIS — Z98.49 CATARACT EXTRACTION STATUS, UNSPECIFIED EYE: Chronic | ICD-10-CM

## 2020-09-15 DIAGNOSIS — Z95.1 PRESENCE OF AORTOCORONARY BYPASS GRAFT: Chronic | ICD-10-CM

## 2020-09-15 DIAGNOSIS — Z98.890 OTHER SPECIFIED POSTPROCEDURAL STATES: Chronic | ICD-10-CM

## 2020-09-15 PROCEDURE — 71250 CT THORAX DX C-: CPT

## 2020-09-15 PROCEDURE — 71250 CT THORAX DX C-: CPT | Mod: 26

## 2020-09-22 ENCOUNTER — APPOINTMENT (OUTPATIENT)
Dept: THORACIC SURGERY | Facility: CLINIC | Age: 85
End: 2020-09-22
Payer: MEDICARE

## 2020-09-22 PROCEDURE — 99443: CPT | Mod: 95

## 2020-09-22 NOTE — ASSESSMENT
[FreeTextEntry1] : 86 year old male, former smoker, who presents today for follow up. He is s/p Left VATS, MELLISA wedge resection on 7/11/16, pathology revealed squamous cell carcinoma, T1aN0. \par \par He has a history of pancreatitis, followed by gastroenterologist Dr. Mckenzie and prostate CA in 2000 treated with radioactive seeds implantation. His PSA levels are being monitored by Dr. Latham. He also has a history of triple bypass in 1994 in [facility].\par \par CT chest scan 9/15/2020:\par -A punctate density within superior segment of right lower lobe (series 2 image 55) is unchanged. \par -The lungs are otherwise clear. \par \par CXR on 01/07/2020:\par - minimal blunting left costophrenic angle may be due to a trace pleural effusion or pleural thickening. The possibility of a tiny left apical pneumothorax is raised \par \par CT Chest on 7/2/19 reveals:\par - RLL superior segment 0.2 cm solid nodule, unchanged \par - No hilar and/or mediastinal adenopathy\par \par I have reviewed the images with the patient and made the following recommendations. I have recommended that the patient follow up in 6 months with a CXR. \par \par Written by Annelise Madera NP, acting as a scribe for Quan Olvera MD.\par The documentation recorded by the scribe accurately reflects the service I personally performed and the decisions made by me. Quan Olvera MD\par \par

## 2020-09-22 NOTE — HISTORY OF PRESENT ILLNESS
[FreeTextEntry1] : 86 year old male, former smoker, who presents today for follow up. He is s/p Left VATS, MELLISA wedge resection on 7/11/16, pathology revealed squamous cell carcinoma, T1aN0. \par \par He has a history of pancreatitis, followed by gastroenterologist Dr. Mckenzie and prostate CA in 2000 treated with radioactive seeds implantation. His PSA levels are being monitored by Dr. Latham. He also has a history of triple bypass in 1994 in [facility].\par \par CT chest scan 9/15/2020:\par -A punctate density within superior segment of right lower lobe (series 2 image 55) is unchanged. \par -The lungs are otherwise clear. \par \par CXR on 01/07/2020:\par - minimal blunting left costophrenic angle may be due to a trace pleural effusion or pleural thickening. The possibility of a tiny left apical pneumothorax is raised \par \par CT Chest on 7/2/19 reveals:\par - RLL superior segment 0.2 cm solid nodule, unchanged \par - No hilar and/or mediastinal adenopathy\par \par The patient denies shortness of breath, fever, chills, dysphagia or hemoptysis.

## 2020-09-22 NOTE — REASON FOR VISIT
[Follow-Up: _____] : a [unfilled] follow-up visit [Verbal consent obtained from patient] : the patient, [unfilled] [FreeTextEntry4] : Annelise Madera, NP

## 2021-07-06 ENCOUNTER — APPOINTMENT (OUTPATIENT)
Dept: UROLOGY | Facility: CLINIC | Age: 86
End: 2021-07-06
Payer: MEDICARE

## 2021-07-06 VITALS
HEART RATE: 70 BPM | SYSTOLIC BLOOD PRESSURE: 166 MMHG | HEIGHT: 66 IN | WEIGHT: 181 LBS | BODY MASS INDEX: 29.09 KG/M2 | DIASTOLIC BLOOD PRESSURE: 78 MMHG | TEMPERATURE: 98.3 F | OXYGEN SATURATION: 96 %

## 2021-07-06 DIAGNOSIS — Z63.4 DISAPPEARANCE AND DEATH OF FAMILY MEMBER: ICD-10-CM

## 2021-07-06 DIAGNOSIS — Z80.42 FAMILY HISTORY OF MALIGNANT NEOPLASM OF PROSTATE: ICD-10-CM

## 2021-07-06 DIAGNOSIS — Z84.1 FAMILY HISTORY OF DISORDERS OF KIDNEY AND URETER: ICD-10-CM

## 2021-07-06 PROCEDURE — 99214 OFFICE O/P EST MOD 30 MIN: CPT

## 2021-07-06 PROCEDURE — 51741 ELECTRO-UROFLOWMETRY FIRST: CPT

## 2021-07-06 PROCEDURE — 51798 US URINE CAPACITY MEASURE: CPT

## 2021-07-06 RX ORDER — PROPRANOLOL HYDROCHLORIDE 20 MG/1
20 TABLET ORAL
Qty: 60 | Refills: 0 | Status: ACTIVE | COMMUNITY
Start: 2021-06-14

## 2021-07-06 RX ORDER — MECLIZINE HYDROCHLORIDE 25 MG/1
25 TABLET ORAL
Qty: 90 | Refills: 0 | Status: ACTIVE | COMMUNITY
Start: 2021-01-14

## 2021-07-06 SDOH — SOCIAL STABILITY - SOCIAL INSECURITY: DISSAPEARANCE AND DEATH OF FAMILY MEMBER: Z63.4

## 2021-07-06 NOTE — ASSESSMENT
[FreeTextEntry1] : 87 year old male with hx of prostate cancer\par PSA sent today\par PVR: 7\par refill flomax 0.8 today\par await old records\par \par Follow up in 1 year

## 2021-07-06 NOTE — END OF VISIT
[FreeTextEntry3] : Medical record entries made by the scribe today, were at my direction and personally dictated to them by me, Dr. Darren Gipson on 07/06/2021. I have reviewed the chart and agree that the record accurately reflects my personal performance of the history, physical exam, assessment, and plan.

## 2021-07-06 NOTE — HISTORY OF PRESENT ILLNESS
[FreeTextEntry1] : 87 year old male here today for routine follow up\par \par Hx of prostate cancer treated with brachytherapy (3/2000)\par Hx hernia repair\par \par last seen in august 2019\par to obtain records from old office \par \par IPSS: 9 \par nocturia every 3 hours,\par on Flomax 0.8\par symptoms not bothersome

## 2021-07-29 ENCOUNTER — APPOINTMENT (OUTPATIENT)
Dept: CT IMAGING | Facility: IMAGING CENTER | Age: 86
End: 2021-07-29

## 2021-08-05 LAB
BILIRUB UR QL STRIP: NORMAL
CLARITY UR: CLEAR
COLLECTION METHOD: NORMAL
GLUCOSE UR-MCNC: NORMAL
HCG UR QL: 0.2 EU/DL
HGB UR QL STRIP.AUTO: NORMAL
KETONES UR-MCNC: NORMAL
LEUKOCYTE ESTERASE UR QL STRIP: NORMAL
NITRITE UR QL STRIP: NORMAL
PH UR STRIP: 7
PROT UR STRIP-MCNC: NORMAL
PSA SERPL-MCNC: 1.64 NG/ML
SP GR UR STRIP: 1.02

## 2021-08-25 ENCOUNTER — NON-APPOINTMENT (OUTPATIENT)
Age: 86
End: 2021-08-25

## 2022-02-11 ENCOUNTER — RX RENEWAL (OUTPATIENT)
Age: 87
End: 2022-02-11

## 2022-08-14 ENCOUNTER — NON-APPOINTMENT (OUTPATIENT)
Age: 87
End: 2022-08-14

## 2022-08-15 ENCOUNTER — OUTPATIENT (OUTPATIENT)
Dept: OUTPATIENT SERVICES | Facility: HOSPITAL | Age: 87
LOS: 1 days | End: 2022-08-15
Payer: MEDICARE

## 2022-08-15 ENCOUNTER — APPOINTMENT (OUTPATIENT)
Dept: CT IMAGING | Facility: IMAGING CENTER | Age: 87
End: 2022-08-15

## 2022-08-15 DIAGNOSIS — Z95.1 PRESENCE OF AORTOCORONARY BYPASS GRAFT: Chronic | ICD-10-CM

## 2022-08-15 DIAGNOSIS — C44.91 BASAL CELL CARCINOMA OF SKIN, UNSPECIFIED: Chronic | ICD-10-CM

## 2022-08-15 DIAGNOSIS — C44.92 SQUAMOUS CELL CARCINOMA OF SKIN, UNSPECIFIED: Chronic | ICD-10-CM

## 2022-08-15 DIAGNOSIS — Z85.46 PERSONAL HISTORY OF MALIGNANT NEOPLASM OF PROSTATE: Chronic | ICD-10-CM

## 2022-08-15 DIAGNOSIS — Z98.49 CATARACT EXTRACTION STATUS, UNSPECIFIED EYE: Chronic | ICD-10-CM

## 2022-08-15 DIAGNOSIS — Z98.890 OTHER SPECIFIED POSTPROCEDURAL STATES: Chronic | ICD-10-CM

## 2022-08-15 DIAGNOSIS — C34.90 MALIGNANT NEOPLASM OF UNSPECIFIED PART OF UNSPECIFIED BRONCHUS OR LUNG: ICD-10-CM

## 2022-08-15 PROCEDURE — 71250 CT THORAX DX C-: CPT | Mod: 26,MH

## 2022-08-15 PROCEDURE — 71250 CT THORAX DX C-: CPT

## 2022-08-23 ENCOUNTER — APPOINTMENT (OUTPATIENT)
Dept: THORACIC SURGERY | Facility: CLINIC | Age: 87
End: 2022-08-23

## 2022-08-23 PROCEDURE — 99443: CPT | Mod: 95

## 2022-08-23 NOTE — HISTORY OF PRESENT ILLNESS
[Home] : at home, [unfilled] , at the time of the visit. [Medical Office: ([facility])___] : at the medical office located in  [Verbal consent obtained from patient] : the patient, [unfilled] [FreeTextEntry1] : \par Mr. Mercado is a 88 year old male, former smoker. He is s/p Left VATS, MELLISA wedge resection on 7/11/16, pathology revealed squamous cell carcinoma, T1aN0. \par \par He has a history of pancreatitis, followed by gastroenterologist Dr. Mckenzie and prostate CA in 2000 treated with radioactive seeds implantation. His PSA levels are being monitored by Dr. Latham. He also has a history of triple bypass in 1994 in [facility].\par \par CXR on 01/07/2020:\par - minimal blunting left costophrenic angle may be due to a trace pleural effusion or pleural thickening. The possibility of a tiny left apical pneumothorax is raised \par \par CT chest scan 9/15/2020:\par -A punctate density within superior segment of right lower lobe (series 2 image 55) is unchanged. \par -The lungs are otherwise clear. \par \par CT chest on 08/15/2022:\par - Partial left upper lobe resection with stable postoperative changes. Previously identified right lower lobe 0.3 cm nodule (image 47, series 2) is unchanged. Lung parenchymal mosaicism. No pleural effusion.\par \par Patient is followed today via Telephonic visit. 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 \par

## 2022-08-23 NOTE — CONSULT LETTER
[Dear  ___] : Dear  [unfilled], [Consult Letter:] : I had the pleasure of evaluating your patient, [unfilled]. [( Thank you for referring [unfilled] for consultation for _____ )] : Thank you for referring [unfilled] for consultation for [unfilled] [Please see my note below.] : Please see my note below. [Consult Closing:] : Thank you very much for allowing me to participate in the care of this patient.  If you have any questions, please do not hesitate to contact me. [Sincerely,] : Sincerely, [FreeTextEntry2] : Dr. Nik Hernandez (Ref)  [FreeTextEntry3] : Quan Olvera MD, FACS \par Chief, Division of Thoracic Surgery \par Director, Minimally Invasive Thoracic Surgery \par Department of Cardiovascular and Thoracic Surgery \par [facility] \par , Cardiovascular and Thoracic Surgery\par

## 2022-08-23 NOTE — ASSESSMENT
[FreeTextEntry1] : \par Mr. Mercado is a 88 year old male, former smoker. He is s/p Left VATS, MELLISA wedge resection on 7/11/16, pathology revealed squamous cell carcinoma, T1aN0. \par \par He has a history of pancreatitis, followed by gastroenterologist Dr. Mckenzie and prostate CA in 2000 treated with radioactive seeds implantation. His PSA levels are being monitored by Dr. Latham. He also has a history of triple bypass in 1994 in [facility].\par \par CXR on 01/07/2020:\par - minimal blunting left costophrenic angle may be due to a trace pleural effusion or pleural thickening. The possibility of a tiny left apical pneumothorax is raised \par \par CT chest scan 9/15/2020:\par -A punctate density within superior segment of right lower lobe (series 2 image 55) is unchanged. \par -The lungs are otherwise clear. \par \par CT chest on 08/15/2022:\par - Partial left upper lobe resection with stable postoperative changes. Previously identified right lower lobe 0.3 cm nodule (image 47, series 2) is unchanged. Lung parenchymal mosaicism. No pleural effusion.\par \par I have reviewed the patient's medical records and diagnostic images at time of this office consultation and have made the following recommendation:\par 1. CT chest reviewed and explained to patient, I recommended patient to return to office in 12 months with CT Chest without contrast. \par \par I personally performed the services described in the documentation, reviewed the documentation recorded by the scribe in my presence and it accurately and completely records my words and actions.\par \par I, Alyssa Arroyo NP, am scribing for and the presence of JOSEP Blackwell, the following sections HISTORY OF PRESENT ILLNESS, PAST MEDICAL/FAMILY/SOCIAL HISTORY; REVIEW OF SYSTEMS; VITAL SIGNS; PHYSICAL EXAM; DISPOSITION.

## 2022-09-13 ENCOUNTER — APPOINTMENT (OUTPATIENT)
Dept: UROLOGY | Facility: CLINIC | Age: 87
End: 2022-09-13

## 2022-09-13 VITALS
SYSTOLIC BLOOD PRESSURE: 152 MMHG | TEMPERATURE: 98.4 F | DIASTOLIC BLOOD PRESSURE: 72 MMHG | HEART RATE: 62 BPM | OXYGEN SATURATION: 95 %

## 2022-09-13 PROCEDURE — 51798 US URINE CAPACITY MEASURE: CPT

## 2022-09-13 PROCEDURE — 99213 OFFICE O/P EST LOW 20 MIN: CPT

## 2022-09-13 RX ORDER — IPRATROPIUM BROMIDE 21 UG/1
0.03 SPRAY NASAL
Qty: 30 | Refills: 0 | Status: DISCONTINUED | COMMUNITY
Start: 2021-05-19 | End: 2022-09-13

## 2022-09-22 ENCOUNTER — NON-APPOINTMENT (OUTPATIENT)
Age: 87
End: 2022-09-22

## 2022-09-22 LAB
PSA SERPL-MCNC: 1.22 NG/ML
URINE CYTOLOGY: NORMAL

## 2022-09-22 NOTE — END OF VISIT
[FreeTextEntry3] : Medical record entries made by the scribe today, were at my direction and personally dictated to them by me, Dr. Darren Gipson on 09/13/2022. I have reviewed the chart and agree that the record accurately reflects my personal performance of the history, physical exam, assessment, and plan.

## 2022-09-22 NOTE — HISTORY OF PRESENT ILLNESS
[FreeTextEntry1] : 88 year old male with h/o prostate ca and LUTS \par \par 12/99 - prostate ca first diagnosed \par T2a [place] 6 \par PSA at diagnosis 11.3 \par \par 9/19 - PSA 2.3 \par  cc \par \par most recent PSA 7/6/21: 1.64 \par \par on 0.8 flomax \par \par mainly c/o frequency\par \par IPSS today: 17 - worsened from 9 in 7/2021\par \par \par

## 2022-09-22 NOTE — PHYSICAL EXAM
[General Appearance - Well Developed] : well developed [General Appearance - Well Nourished] : well nourished [Normal Appearance] : normal appearance [Well Groomed] : well groomed [General Appearance - In No Acute Distress] : no acute distress [Abdomen Soft] : soft [Abdomen Tenderness] : non-tender [Costovertebral Angle Tenderness] : no ~M costovertebral angle tenderness [Urethral Meatus] : meatus normal [Penis Abnormality] : normal circumcised penis [Scrotum] : the scrotum was normal [Epididymis] : the epididymides were normal [Testes Tenderness] : no tenderness of the testes [Testes Mass (___cm)] : there were no testicular masses [Anus Abnormality] : the anus and perineum were normal [Rectal Exam - Rectum] : digital rectal exam was normal [Prostate Tenderness] : the prostate was not tender [Prostate Size ___ (0-4)] : prostate size [unfilled] (scale: 0-4) [FreeTextEntry1] : left side of prostate firmer than right

## 2022-09-22 NOTE — ASSESSMENT
[FreeTextEntry1] : 88 year old male with LUTS and prostate ca s/p xrt\par h/o prostate ca\par \par PVR today: 3 cc \par \par PSA, cytology sent today \par \par pending psa, \par f/u 6 months

## 2023-02-06 ENCOUNTER — RX RENEWAL (OUTPATIENT)
Age: 88
End: 2023-02-06

## 2023-03-13 ENCOUNTER — APPOINTMENT (OUTPATIENT)
Dept: UROLOGY | Facility: CLINIC | Age: 88
End: 2023-03-13
Payer: MEDICARE

## 2023-03-13 VITALS
OXYGEN SATURATION: 95 % | HEART RATE: 61 BPM | TEMPERATURE: 97.6 F | DIASTOLIC BLOOD PRESSURE: 64 MMHG | SYSTOLIC BLOOD PRESSURE: 118 MMHG

## 2023-03-13 DIAGNOSIS — C61 MALIGNANT NEOPLASM OF PROSTATE: ICD-10-CM

## 2023-03-13 PROCEDURE — 99213 OFFICE O/P EST LOW 20 MIN: CPT

## 2023-03-19 LAB — PSA SERPL-MCNC: 1.12 NG/ML

## 2023-03-19 NOTE — HISTORY OF PRESENT ILLNESS
[FreeTextEntry1] : 89 year old M w hx of prostate cancer and LUTS, \par \par here for follow up and PSA\par \par prostate cancer dx, 12/99-s/p brachytherapy\par \par no complaints with urination, is doing well\par \par states he had recent colonoscopy for diverticulitis\par \par PSA: 9/13/2022, 1.22\par 7/6/2021, 1.64\par \par IPSS today: 14\par 9/13/2022, 17\par 7/6/21, 9

## 2023-03-19 NOTE — ASSESSMENT
[FreeTextEntry1] : 89 year old M following up for exam and PSA\par \par ordered  PSA today\par \par most recent PVR- 9/13/22, 3\par 7/6/21, 7\par \par pt is doing well\par \par pending PSA, - f/u in 1 year\par

## 2023-03-20 ENCOUNTER — NON-APPOINTMENT (OUTPATIENT)
Age: 88
End: 2023-03-20

## 2023-07-31 ENCOUNTER — OUTPATIENT (OUTPATIENT)
Dept: OUTPATIENT SERVICES | Facility: HOSPITAL | Age: 88
LOS: 1 days | End: 2023-07-31
Payer: MEDICARE

## 2023-07-31 ENCOUNTER — APPOINTMENT (OUTPATIENT)
Dept: CT IMAGING | Facility: CLINIC | Age: 88
End: 2023-07-31
Payer: MEDICARE

## 2023-07-31 DIAGNOSIS — C34.90 MALIGNANT NEOPLASM OF UNSPECIFIED PART OF UNSPECIFIED BRONCHUS OR LUNG: ICD-10-CM

## 2023-07-31 DIAGNOSIS — Z98.890 OTHER SPECIFIED POSTPROCEDURAL STATES: Chronic | ICD-10-CM

## 2023-07-31 DIAGNOSIS — C44.92 SQUAMOUS CELL CARCINOMA OF SKIN, UNSPECIFIED: Chronic | ICD-10-CM

## 2023-07-31 DIAGNOSIS — Z95.1 PRESENCE OF AORTOCORONARY BYPASS GRAFT: Chronic | ICD-10-CM

## 2023-07-31 DIAGNOSIS — Z85.46 PERSONAL HISTORY OF MALIGNANT NEOPLASM OF PROSTATE: Chronic | ICD-10-CM

## 2023-07-31 DIAGNOSIS — C44.91 BASAL CELL CARCINOMA OF SKIN, UNSPECIFIED: Chronic | ICD-10-CM

## 2023-07-31 DIAGNOSIS — Z98.49 CATARACT EXTRACTION STATUS, UNSPECIFIED EYE: Chronic | ICD-10-CM

## 2023-07-31 PROCEDURE — 71250 CT THORAX DX C-: CPT | Mod: 26,MH

## 2023-07-31 PROCEDURE — 71250 CT THORAX DX C-: CPT

## 2023-08-15 ENCOUNTER — APPOINTMENT (OUTPATIENT)
Dept: THORACIC SURGERY | Facility: CLINIC | Age: 88
End: 2023-08-15
Payer: MEDICARE

## 2023-08-15 DIAGNOSIS — C34.90 MALIGNANT NEOPLASM OF UNSPECIFIED PART OF UNSPECIFIED BRONCHUS OR LUNG: ICD-10-CM

## 2023-08-15 PROCEDURE — 99442: CPT | Mod: 95

## 2023-08-15 NOTE — HISTORY OF PRESENT ILLNESS
[FreeTextEntry1] : Mr. Mercado is a 89 year old male, former smoker. He is s/p Left VATS, MELLISA wedge resection on 7/11/16, pathology revealed squamous cell carcinoma, T1aN0.   He has a history of pancreatitis, followed by gastroenterologist Dr. Mckenzie and prostate CA in 2000 treated with radioactive seeds implantation. His PSA levels are being monitored by Dr. Latham. He also has a history of triple bypass in 1994 in [facility].  CXR on 01/07/2020: - minimal blunting left costophrenic angle may be due to a trace pleural effusion or pleural thickening. The possibility of a tiny left apical pneumothorax is raised   CT chest scan 9/15/2020: -A punctate density within superior segment of right lower lobe (series 2 image 55) is unchanged.  -The lungs are otherwise clear.   CT chest on 08/15/2022: - Partial left upper lobe resection with stable postoperative changes. Previously identified right lower lobe 0.3 cm nodule (image 47, series 2) is unchanged. Lung parenchymal mosaicism. No pleural effusion.  CT Chest on 07/31/2023: - There are postsurgical changes in the left paramediastinal region with suture material, related to left upper lobe wedge resection.  - There is a calcified granuloma in the superior segment of left lower lobe.  - There is post surgical changes in the thoracic inlets related to patient's parathyroidectomy. - There is a small hiatal hernia. - Dilated main pulmonary artery suggestive of pulmonary hypertension. 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 Overall, he reports to be feeling well. Denies any chest pain, shortness of breath, cough, or hemoptysis.

## 2023-08-15 NOTE — REASON FOR VISIT
[Home] : at home, [unfilled] , at the time of the visit. [Medical Office: ([facility])___] : at the medical office located in  [Patient] : the patient [Follow-Up: _____] : a [unfilled] follow-up visit [This encounter was initiated by telehealth (audio with video) and converted to telephone (audio only) due to technical difficulties.] : This encounter was initiated by telehealth (audio with video) and converted to telephone (audio only) due to technical difficulties.

## 2023-08-15 NOTE — ASSESSMENT
[FreeTextEntry1] : Mr. Mercado is a 89 year old male, former smoker. He is s/p Left VATS, MELLISA wedge resection on 7/11/16, pathology revealed squamous cell carcinoma, T1aN0.   He has a history of pancreatitis, followed by gastroenterologist Dr. Mckenzie and prostate CA in 2000 treated with radioactive seeds implantation. His PSA levels are being monitored by Dr. Latham. He also has a history of triple bypass in 1994 in [facility].  CT Chest on 07/31/2023: - There are postsurgical changes in the left paramediastinal region with suture material, related to left upper lobe wedge resection.  - There is a calcified granuloma in the superior segment of left lower lobe.  - There is post surgical changes in the thoracic inlets related to patient's parathyroidectomy. - There is a small hiatal hernia. - Dilated main pulmonary artery suggestive of pulmonary hypertension. 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 CT Chest reviewed, stable findings. I discussed he returns to clinic in 1 year with repeat CT Chest without contrast. 2. Continue follow up with PCP.  Recommendations reviewed with patient during this office visit, and all questions answered; Patient instructed on the importance of follow up and verbalizes understanding.    I, MARY BlackwellIM, personally performed the evaluation and management (E/M) services for this established patient. That E/M includes conducting the examination, assessing all new/exacerbated conditions, and establishing a new plan of care. Today, my ACP, Arturo Amador NP, was here to observe my evaluation and management services for this new problem/exacerbated condition to be followed going forward.

## 2023-10-13 ENCOUNTER — APPOINTMENT (OUTPATIENT)
Dept: UROLOGY | Facility: CLINIC | Age: 88
End: 2023-10-13
Payer: MEDICARE

## 2023-10-13 VITALS
SYSTOLIC BLOOD PRESSURE: 157 MMHG | OXYGEN SATURATION: 95 % | HEART RATE: 70 BPM | DIASTOLIC BLOOD PRESSURE: 72 MMHG | TEMPERATURE: 98 F

## 2023-10-13 DIAGNOSIS — R39.9 UNSPECIFIED SYMPTOMS AND SIGNS INVOLVING THE GENITOURINARY SYSTEM: ICD-10-CM

## 2023-10-13 PROCEDURE — 99213 OFFICE O/P EST LOW 20 MIN: CPT

## 2023-10-13 RX ORDER — LEVETIRACETAM 500 MG/1
500 TABLET, FILM COATED ORAL
Refills: 0 | Status: ACTIVE | COMMUNITY

## 2023-10-13 RX ORDER — HYOSCYAMINE SULFATE 0.375 MG
0.38 TABLET, EXTENDED RELEASE 12 HR ORAL
Refills: 0 | Status: ACTIVE | COMMUNITY

## 2023-10-14 LAB
APPEARANCE: CLEAR
BACTERIA: NEGATIVE /HPF
BILIRUBIN URINE: NEGATIVE
BLOOD URINE: NEGATIVE
CAST: 0 /LPF
COLOR: YELLOW
EPITHELIAL CELLS: 0 /HPF
GLUCOSE QUALITATIVE U: NEGATIVE MG/DL
KETONES URINE: NEGATIVE MG/DL
LEUKOCYTE ESTERASE URINE: NEGATIVE
MICROSCOPIC-UA: NORMAL
NITRITE URINE: NEGATIVE
PH URINE: 7
PROTEIN URINE: NEGATIVE MG/DL
RED BLOOD CELLS URINE: 0 /HPF
SPECIFIC GRAVITY URINE: 1.01
UROBILINOGEN URINE: 0.2 MG/DL
WHITE BLOOD CELLS URINE: 1 /HPF

## 2023-10-16 LAB — BACTERIA UR CULT: NORMAL

## 2023-10-20 ENCOUNTER — OUTPATIENT (OUTPATIENT)
Dept: OUTPATIENT SERVICES | Facility: HOSPITAL | Age: 88
LOS: 1 days | End: 2023-10-20
Payer: MEDICARE

## 2023-10-20 ENCOUNTER — APPOINTMENT (OUTPATIENT)
Dept: CT IMAGING | Facility: CLINIC | Age: 88
End: 2023-10-20
Payer: MEDICARE

## 2023-10-20 DIAGNOSIS — C44.91 BASAL CELL CARCINOMA OF SKIN, UNSPECIFIED: Chronic | ICD-10-CM

## 2023-10-20 DIAGNOSIS — Z95.1 PRESENCE OF AORTOCORONARY BYPASS GRAFT: Chronic | ICD-10-CM

## 2023-10-20 DIAGNOSIS — Z85.46 PERSONAL HISTORY OF MALIGNANT NEOPLASM OF PROSTATE: Chronic | ICD-10-CM

## 2023-10-20 DIAGNOSIS — Z98.890 OTHER SPECIFIED POSTPROCEDURAL STATES: Chronic | ICD-10-CM

## 2023-10-20 DIAGNOSIS — R31.29 OTHER MICROSCOPIC HEMATURIA: ICD-10-CM

## 2023-10-20 DIAGNOSIS — C44.92 SQUAMOUS CELL CARCINOMA OF SKIN, UNSPECIFIED: Chronic | ICD-10-CM

## 2023-10-20 DIAGNOSIS — Z98.49 CATARACT EXTRACTION STATUS, UNSPECIFIED EYE: Chronic | ICD-10-CM

## 2023-10-20 PROCEDURE — 74176 CT ABD & PELVIS W/O CONTRAST: CPT | Mod: 26,MH

## 2023-10-20 PROCEDURE — 74176 CT ABD & PELVIS W/O CONTRAST: CPT

## 2023-11-08 ENCOUNTER — RESULT CHARGE (OUTPATIENT)
Age: 88
End: 2023-11-08

## 2023-11-09 RX ORDER — SULFAMETHOXAZOLE AND TRIMETHOPRIM 800; 160 MG/1; MG/1
800-160 TABLET ORAL TWICE DAILY
Qty: 2 | Refills: 0 | Status: ACTIVE | COMMUNITY
Start: 2023-11-09

## 2023-11-13 ENCOUNTER — APPOINTMENT (OUTPATIENT)
Dept: UROLOGY | Facility: CLINIC | Age: 88
End: 2023-11-13

## 2023-11-20 ENCOUNTER — RESULT CHARGE (OUTPATIENT)
Age: 88
End: 2023-11-20

## 2023-11-21 ENCOUNTER — APPOINTMENT (OUTPATIENT)
Dept: UROLOGY | Facility: CLINIC | Age: 88
End: 2023-11-21
Payer: MEDICARE

## 2023-11-21 VITALS
OXYGEN SATURATION: 96 % | SYSTOLIC BLOOD PRESSURE: 145 MMHG | HEART RATE: 59 BPM | TEMPERATURE: 97.8 F | DIASTOLIC BLOOD PRESSURE: 70 MMHG

## 2023-11-21 DIAGNOSIS — R31.29 OTHER MICROSCOPIC HEMATURIA: ICD-10-CM

## 2023-11-21 PROCEDURE — 52000 CYSTOURETHROSCOPY: CPT

## 2023-11-21 RX ORDER — SULFAMETHOXAZOLE AND TRIMETHOPRIM 800; 160 MG/1; MG/1
800-160 TABLET ORAL TWICE DAILY
Qty: 2 | Refills: 0 | Status: ACTIVE | COMMUNITY
Start: 2023-11-21

## 2024-03-11 ENCOUNTER — APPOINTMENT (OUTPATIENT)
Dept: UROLOGY | Facility: CLINIC | Age: 89
End: 2024-03-11

## 2024-07-19 ENCOUNTER — NON-APPOINTMENT (OUTPATIENT)
Age: 89
End: 2024-07-19

## 2024-07-29 ENCOUNTER — OUTPATIENT (OUTPATIENT)
Dept: OUTPATIENT SERVICES | Facility: HOSPITAL | Age: 89
LOS: 1 days | End: 2024-07-29
Payer: MEDICARE

## 2024-07-29 ENCOUNTER — APPOINTMENT (OUTPATIENT)
Dept: CT IMAGING | Facility: CLINIC | Age: 89
End: 2024-07-29
Payer: MEDICARE

## 2024-07-29 DIAGNOSIS — Z98.890 OTHER SPECIFIED POSTPROCEDURAL STATES: Chronic | ICD-10-CM

## 2024-07-29 DIAGNOSIS — Z85.46 PERSONAL HISTORY OF MALIGNANT NEOPLASM OF PROSTATE: Chronic | ICD-10-CM

## 2024-07-29 DIAGNOSIS — Z95.1 PRESENCE OF AORTOCORONARY BYPASS GRAFT: Chronic | ICD-10-CM

## 2024-07-29 DIAGNOSIS — C34.90 MALIGNANT NEOPLASM OF UNSPECIFIED PART OF UNSPECIFIED BRONCHUS OR LUNG: ICD-10-CM

## 2024-07-29 DIAGNOSIS — C44.91 BASAL CELL CARCINOMA OF SKIN, UNSPECIFIED: Chronic | ICD-10-CM

## 2024-07-29 DIAGNOSIS — C44.92 SQUAMOUS CELL CARCINOMA OF SKIN, UNSPECIFIED: Chronic | ICD-10-CM

## 2024-07-29 DIAGNOSIS — Z98.49 CATARACT EXTRACTION STATUS, UNSPECIFIED EYE: Chronic | ICD-10-CM

## 2024-07-29 PROCEDURE — 71250 CT THORAX DX C-: CPT

## 2024-07-29 PROCEDURE — 71250 CT THORAX DX C-: CPT | Mod: 26,MH

## 2024-08-06 ENCOUNTER — APPOINTMENT (OUTPATIENT)
Dept: THORACIC SURGERY | Facility: CLINIC | Age: 89
End: 2024-08-06

## 2024-08-06 PROCEDURE — 99442: CPT | Mod: 93

## 2024-08-06 NOTE — ASSESSMENT
[FreeTextEntry1] : Mr. Mercado is a 89 year old male, former smoker. He is s/p Left VATS, MELLISA wedge resection on 7/11/16, pathology revealed squamous cell carcinoma, T1aN0.   He has a history of pancreatitis, followed by gastroenterologist Dr. Mckenzie and prostate CA in 2000 treated with radioactive seeds implantation. His PSA levels are being monitored by Dr. Latham. He also has a history of triple bypass in 1994 in [facility].  CT Chest on 07/31/2023: - There are postsurgical changes in the left paramediastinal region with suture material, related to left upper lobe wedge resection.  - There is a calcified granuloma in the superior segment of left lower lobe.  - There is post surgical changes in the thoracic inlets related to patient's parathyroidectomy. - There is a small hiatal hernia. - Dilated main pulmonary artery suggestive of pulmonary hypertension. 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 CT Chest reviewed, stable findings, no evidence of recurrence. I discussed he returns to clinic in 1 year with repeat CT Chest without contrast. 2. Continue follow up with PCP.  Recommendations reviewed with patient during this office visit, and all questions answered; Patient instructed on the importance of follow up and verbalizes understanding.    I, MARY BlackwellIM, personally performed the evaluation and management (E/M) services for this established patient. That E/M includes conducting the examination, assessing all new/exacerbated conditions, and establishing a new plan of care. Today, my ACP, Arturo Amador NP, was here to observe my evaluation and management services for this new problem/exacerbated condition to be followed going forward.

## 2024-08-06 NOTE — CONSULT LETTER
[Dear  ___] : Dear  [unfilled], [Consult Letter:] : I had the pleasure of evaluating your patient, [unfilled]. normal (ped)... In no apparent distress, appears well developed and well nourished. [( Thank you for referring [unfilled] for consultation for _____ )] : Thank you for referring [unfilled] for consultation for [unfilled] [Please see my note below.] : Please see my note below. [Consult Closing:] : Thank you very much for allowing me to participate in the care of this patient.  If you have any questions, please do not hesitate to contact me. [Sincerely,] : Sincerely, [FreeTextEntry2] : Dr. Nik Hernandez (Ref)  [FreeTextEntry3] : Quan Olvera MD, FACS \par  Chief, Division of Thoracic Surgery \par  Director, Minimally Invasive Thoracic Surgery \par  Department of Cardiovascular and Thoracic Surgery \par  [facility] \par  , Cardiovascular and Thoracic Surgery\par

## 2024-08-06 NOTE — ASSESSMENT
[FreeTextEntry1] : Mr. Mercado is a 89 year old male, former smoker. He is s/p Left VATS, MELLISA wedge resection on 7/11/16, pathology revealed squamous cell carcinoma, T1aN0.   He has a history of pancreatitis, followed by gastroenterologist Dr. Mckenzie and prostate CA in 2000 treated with radioactive seeds implantation. His PSA levels are being monitored by Dr. Latham. He also has a history of triple bypass in 1994 in [facility].  CT Chest on 07/31/2023: - There are postsurgical changes in the left paramediastinal region with suture material, related to left upper lobe wedge resection.  - There is a calcified granuloma in the superior segment of left lower lobe.  - There is post surgical changes in the thoracic inlets related to patient's parathyroidectomy. - There is a small hiatal hernia. - Dilated main pulmonary artery suggestive of pulmonary hypertension. 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 CT Chest reviewed, stable findings, no evidence of recurrence. I discussed he returns to clinic in 1 year with repeat CT Chest without contrast. 2. Continue follow up with PCP.  Recommendations reviewed with patient during this office visit, and all questions answered; Patient instructed on the importance of follow up and verbalizes understanding.    I, MARY BlackwellIM, personally performed the evaluation and management (E/M) services for this established patient. That E/M includes conducting the examination, assessing all new/exacerbated conditions, and establishing a new plan of care. Today, my ACP, Arturo Amador NP, was here to observe my evaluation and management services for this new problem/exacerbated condition to be followed going forward.

## 2024-08-06 NOTE — REASON FOR VISIT
[Follow-Up: _____] : a [unfilled] follow-up visit [Home] : at home, [unfilled] , at the time of the visit. [Medical Office: ([facility])___] : at the medical office located in  [Other:____] : [unfilled] [Verbal consent obtained from patient] : the patient, [unfilled]

## 2024-08-06 NOTE — CONSULT LETTER
[Dear  ___] : Dear  [unfilled], [Consult Letter:] : I had the pleasure of evaluating your patient, [unfilled]. [( Thank you for referring [unfilled] for consultation for _____ )] : Thank you for referring [unfilled] for consultation for [unfilled] [Please see my note below.] : Please see my note below. [Consult Closing:] : Thank you very much for allowing me to participate in the care of this patient.  If you have any questions, please do not hesitate to contact me. [Sincerely,] : Sincerely, [FreeTextEntry3] : Quan Olvera MD, FACS \par  Chief, Division of Thoracic Surgery \par  Director, Minimally Invasive Thoracic Surgery \par  Department of Cardiovascular and Thoracic Surgery \par  [facility] \par  , Cardiovascular and Thoracic Surgery\par   [FreeTextEntry2] : Dr. Nik Hernandez (Ref)

## 2024-08-06 NOTE — REASON FOR VISIT
[Follow-Up: _____] : a [unfilled] follow-up visit [Home] : at home, [unfilled] , at the time of the visit. [Medical Office: ([facility])___] : at the medical office located in  [Other:____] : [unfilled] [Verbal consent obtained from patient] : the patient, [unfilled]

## 2024-08-06 NOTE — HISTORY OF PRESENT ILLNESS
[FreeTextEntry1] : Mr. Mercado is a 89 year old male, former smoker. He is s/p Left VATS, MELLISA wedge resection on 7/11/16, pathology revealed squamous cell carcinoma, T1aN0.   He has a history of pancreatitis, followed by gastroenterologist Dr. Mckenzie and prostate CA in 2000 treated with radioactive seeds implantation. His PSA levels are being monitored by Dr. Latham. He also has a history of triple bypass in 1994 in [facility].  CXR on 01/07/2020: - minimal blunting left costophrenic angle may be due to a trace pleural effusion or pleural thickening. The possibility of a tiny left apical pneumothorax is raised   CT chest scan 9/15/2020: -A punctate density within superior segment of right lower lobe (series 2 image 55) is unchanged.  -The lungs are otherwise clear.   CT chest on 08/15/2022: - Partial left upper lobe resection with stable postoperative changes. Previously identified right lower lobe 0.3 cm nodule (image 47, series 2) is unchanged. Lung parenchymal mosaicism. No pleural effusion.  CT Chest on 07/31/2023: - There are postsurgical changes in the left paramediastinal region with suture material, related to left upper lobe wedge resection.  - There is a calcified granuloma in the superior segment of left lower lobe.  - There is post surgical changes in the thoracic inlets related to patient's parathyroidectomy. - There is a small hiatal hernia. - Dilated main pulmonary artery suggestive of pulmonary hypertension. 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  - 0.3 cm nodule in the left lower lobe is unchanged when compared to previous exam.  - Calcified nodule is noted in the right lung.  Patient is followed today via Telephonic visit. Overall, he reports to be feeling well. Denies any chest pain, shortness of breath, cough, or hemoptysis.

## 2024-08-06 NOTE — HISTORY OF PRESENT ILLNESS
[FreeTextEntry1] : Mr. Mercado is a 89 year old male, former smoker. He is s/p Left VATS, MELLISA wedge resection on 7/11/16, pathology revealed squamous cell carcinoma, T1aN0.   He has a history of pancreatitis, followed by gastroenterologist Dr. Mckenzie and prostate CA in 2000 treated with radioactive seeds implantation. His PSA levels are being monitored by Dr. Latham. He also has a history of triple bypass in 1994 in [facility].  CXR on 01/07/2020: - minimal blunting left costophrenic angle may be due to a trace pleural effusion or pleural thickening. The possibility of a tiny left apical pneumothorax is raised   CT chest scan 9/15/2020: -A punctate density within superior segment of right lower lobe (series 2 image 55) is unchanged.  -The lungs are otherwise clear.   CT chest on 08/15/2022: - Partial left upper lobe resection with stable postoperative changes. Previously identified right lower lobe 0.3 cm nodule (image 47, series 2) is unchanged. Lung parenchymal mosaicism. No pleural effusion.  CT Chest on 07/31/2023: - There are postsurgical changes in the left paramediastinal region with suture material, related to left upper lobe wedge resection.  - There is a calcified granuloma in the superior segment of left lower lobe.  - There is post surgical changes in the thoracic inlets related to patient's parathyroidectomy. - There is a small hiatal hernia. - Dilated main pulmonary artery suggestive of pulmonary hypertension. 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  - 0.3 cm nodule in the left lower lobe is unchanged when compared to previous exam.  - Calcified nodule is noted in the right lung.  Patient is followed today via Telephonic visit. Overall, he reports to be feeling well. Denies any chest pain, shortness of breath, cough, or hemoptysis.

## 2025-01-09 ENCOUNTER — APPOINTMENT (OUTPATIENT)
Dept: UROLOGY | Facility: CLINIC | Age: 89
End: 2025-01-09
Payer: MEDICARE

## 2025-01-09 VITALS — OXYGEN SATURATION: 95 % | DIASTOLIC BLOOD PRESSURE: 80 MMHG | SYSTOLIC BLOOD PRESSURE: 198 MMHG | HEART RATE: 68 BPM

## 2025-01-09 DIAGNOSIS — R39.9 UNSPECIFIED SYMPTOMS AND SIGNS INVOLVING THE GENITOURINARY SYSTEM: ICD-10-CM

## 2025-01-09 PROCEDURE — 99213 OFFICE O/P EST LOW 20 MIN: CPT

## 2025-01-10 LAB
ALBUMIN SERPL ELPH-MCNC: 4.2 G/DL
ANION GAP SERPL CALC-SCNC: 11 MMOL/L
BUN SERPL-MCNC: 20 MG/DL
CALCIUM SERPL-MCNC: 9.5 MG/DL
CHLORIDE SERPL-SCNC: 102 MMOL/L
CO2 SERPL-SCNC: 27 MMOL/L
CREAT SERPL-MCNC: 0.66 MG/DL
EGFR: 89 ML/MIN/1.73M2
GLUCOSE SERPL-MCNC: 113 MG/DL
PHOSPHATE SERPL-MCNC: 2.6 MG/DL
POTASSIUM SERPL-SCNC: 4.6 MMOL/L
PSA SERPL-MCNC: 1.07 NG/ML
SODIUM SERPL-SCNC: 141 MMOL/L

## 2025-01-12 LAB — BACTERIA UR CULT: NORMAL

## 2025-02-04 ENCOUNTER — APPOINTMENT (OUTPATIENT)
Dept: UROLOGY | Facility: CLINIC | Age: 89
End: 2025-02-04
Payer: MEDICARE

## 2025-02-04 VITALS — OXYGEN SATURATION: 95 % | HEART RATE: 72 BPM | SYSTOLIC BLOOD PRESSURE: 185 MMHG | DIASTOLIC BLOOD PRESSURE: 92 MMHG

## 2025-02-04 PROCEDURE — 51784 ANAL/URINARY MUSCLE STUDY: CPT

## 2025-02-04 PROCEDURE — 51728 CYSTOMETROGRAM W/VP: CPT

## 2025-02-04 PROCEDURE — 99214 OFFICE O/P EST MOD 30 MIN: CPT | Mod: 25

## 2025-02-04 PROCEDURE — 99214 OFFICE O/P EST MOD 30 MIN: CPT

## 2025-02-06 ENCOUNTER — APPOINTMENT (OUTPATIENT)
Dept: UROLOGY | Facility: CLINIC | Age: 89
End: 2025-02-06
Payer: MEDICARE

## 2025-02-06 VITALS — DIASTOLIC BLOOD PRESSURE: 87 MMHG | OXYGEN SATURATION: 96 % | SYSTOLIC BLOOD PRESSURE: 188 MMHG | HEART RATE: 68 BPM

## 2025-02-06 DIAGNOSIS — R39.9 UNSPECIFIED SYMPTOMS AND SIGNS INVOLVING THE GENITOURINARY SYSTEM: ICD-10-CM

## 2025-02-06 PROCEDURE — 99213 OFFICE O/P EST LOW 20 MIN: CPT

## 2025-02-06 RX ORDER — OXYBUTYNIN CHLORIDE 5 MG/1
5 TABLET ORAL
Qty: 60 | Refills: 0 | Status: ACTIVE | COMMUNITY
Start: 2025-02-06 | End: 1900-01-01

## 2025-02-11 ENCOUNTER — APPOINTMENT (OUTPATIENT)
Dept: UROLOGY | Facility: CLINIC | Age: 89
End: 2025-02-11

## 2025-02-21 ENCOUNTER — TRANSCRIPTION ENCOUNTER (OUTPATIENT)
Age: 89
End: 2025-02-21

## 2025-03-03 ENCOUNTER — APPOINTMENT (OUTPATIENT)
Dept: UROLOGY | Facility: CLINIC | Age: 89
End: 2025-03-03
Payer: MEDICARE

## 2025-03-03 VITALS
WEIGHT: 181 LBS | DIASTOLIC BLOOD PRESSURE: 66 MMHG | SYSTOLIC BLOOD PRESSURE: 182 MMHG | OXYGEN SATURATION: 96 % | BODY MASS INDEX: 29.09 KG/M2 | HEART RATE: 78 BPM | TEMPERATURE: 97.8 F | HEIGHT: 66 IN

## 2025-03-03 DIAGNOSIS — R39.9 UNSPECIFIED SYMPTOMS AND SIGNS INVOLVING THE GENITOURINARY SYSTEM: ICD-10-CM

## 2025-03-03 PROCEDURE — 99213 OFFICE O/P EST LOW 20 MIN: CPT
